# Patient Record
(demographics unavailable — no encounter records)

---

## 2024-12-02 NOTE — ASSESSMENT
[Long Term Vascular Complications] : long term vascular complications of diabetes [Carbohydrate Consistent Diet] : carbohydrate consistent diet [Importance of Diet and Exercise] : importance of diet and exercise to improve glycemic control, achieve weight loss and improve cardiovascular health [Bisphosphonate Therapy] : Risks and benefits of bisphosphonate therapy were  discussed with the patient including gastroesophageal irritation, osteonecrosis of the jaw, and atypical femur fractures, and acute phase reaction [Raloxifene Therapy] : Risks and benefits of Raloxifene therapy were discussed with the patient including blood clots, hot flashes, and cramps [FreeTextEntry1] : mastalgia palpable painful nodule on R breast. stat referral for US/mammogram. advised to discuss urgently w/ PCP as well for breast surgery referral. referrals provided. Verbalized understanding and agrees with treatment plan, will contact MD and seek emergency medical care if condition changes.  1) DM2: controlled, A1C o. target of <7%. Natural hx of the disease and importance of treatment targets discussed at length, she verbalized understanding. ADA diet and importance of exercise discussed at length. Plan is to cont tradjenta. Refer to Nutritionist today. We mu check microalbumin, lipids and labs on the NV. Discuss vaccines and podiatry/opthalmology referrals on NV  2) abnormal TSH appears clinically and biochemically euthyroid, TSH at age appropriate target, monitor TFTs, repeat w/ Ab assessment wnl, no need for LT4 at this time.  3) Essential HTN: Pt is at goal BP and on an Arb. Reassess microalbumin prior to the NV.  4) Dyslipidemia: Pt is on a moderate intensity statin. Atorvastatin 40 mg QDaily. REassess lipids on the next visit. LDL target <100.  - Bone health:  Given her hip fracture risk of >3%, she would classify as osteoporotic and would benefit from 3-5 years of antiresorptive therapy, likely w/ oral BP as she has had treatment failure w/ evista. Risks and benefits including ONJ, AF and GERD discussed at length. She verbalized understanding and she would like to continue vitamin d and dietary calcium for 2ry prevention of fractures. Now on Alendronate per PCP Rx since 8/2023, cont 3-5 years. Labs not suggestive of secondary causes of OP. Referred to physical therapy for LE strengthening exercises and mobility assessment.

## 2024-12-02 NOTE — HISTORY OF PRESENT ILLNESS
[FreeTextEntry1] : 83 y/o F w/ Hx of DM2, HTN, HLD, OP initial evaluation and management of several issues generally feels well and endorses no acute complaints. reports chief reason for referral is for abnormal TFTs, TSH in 2/2023 was 6, fT4 at 1. no collateral info. reports being in normal state of health. denies biotin/amiodarone/lithium/steroid exp. reports no recent hosp/ED visits. She otherwise denies any f/c, CP, SOB, palpitations, tremors, depressed mood, anxiety, palpitations, n/v, stool/urinary abn, skin/weight changes, heat/cold intolerance, HAs, breast/nipple changes, polyuria/polydipsia/nocturia or other complaints.   12/2024 Here for /fu, generally feels well and endorses no acute complaints. No interval events since LV. Today comes for lab f/u, euthyroid w/ TSH at 4 and A1C at 5.8, notes recent onset R breast pain, noted palpable lump, confirmed on PE today by me. reports no recent US/mammograms over the last 5 years. she again denies any dysphagia, hoarseness, neck tenderness or new palpable masses. she again denies any family history of thyroid disorders or personal exposure to ionizing radiation. reports she has been on evista for OP fracture prevention for 15 years, recent DXA from 2/2023 showed progression on treatment. results not available, consumes dietary ca and vit D replacement, denies past fractures.